# Patient Record
Sex: FEMALE | URBAN - METROPOLITAN AREA
[De-identification: names, ages, dates, MRNs, and addresses within clinical notes are randomized per-mention and may not be internally consistent; named-entity substitution may affect disease eponyms.]

---

## 2022-05-31 ENCOUNTER — OFFICE VISIT (OUTPATIENT)
Dept: URBAN - METROPOLITAN AREA CLINIC 6 | Facility: CLINIC | Age: 72
End: 2022-05-31
Payer: MEDICARE

## 2022-05-31 DIAGNOSIS — H43.813 VITREOUS DEGENERATION, BILATERAL: Primary | ICD-10-CM

## 2022-05-31 DIAGNOSIS — H02.831 DERMATOCHALASIS OF RIGHT UPPER EYELID: ICD-10-CM

## 2022-05-31 DIAGNOSIS — H02.834 DERMATOCHALASIS OF LEFT UPPER EYELID: ICD-10-CM

## 2022-05-31 DIAGNOSIS — H04.123 TEAR FILM INSUFFICIENCY OF BILATERAL LACRIMAL GLANDS: ICD-10-CM

## 2022-05-31 PROCEDURE — 92014 COMPRE OPH EXAM EST PT 1/>: CPT | Performed by: OPTOMETRIST

## 2022-05-31 ASSESSMENT — INTRAOCULAR PRESSURE
OD: 10
OS: 10

## 2023-05-30 ENCOUNTER — OFFICE VISIT (OUTPATIENT)
Dept: URBAN - METROPOLITAN AREA CLINIC 6 | Facility: CLINIC | Age: 73
End: 2023-05-30
Payer: MEDICARE

## 2023-05-30 DIAGNOSIS — H02.834 DERMATOCHALASIS OF LEFT UPPER EYELID: ICD-10-CM

## 2023-05-30 DIAGNOSIS — H31.012 MACULA SCAR OF POST POLE OF LEFT EYE: Primary | ICD-10-CM

## 2023-05-30 DIAGNOSIS — Z96.1 PRESENCE OF INTRAOCULAR LENS: ICD-10-CM

## 2023-05-30 DIAGNOSIS — H53.8 OTHER VISUAL DISTURBANCES: ICD-10-CM

## 2023-05-30 DIAGNOSIS — H02.831 DERMATOCHALASIS OF RIGHT UPPER EYELID: ICD-10-CM

## 2023-05-30 DIAGNOSIS — H02.822 CYSTS OF RIGHT LOWER EYELID: ICD-10-CM

## 2023-05-30 DIAGNOSIS — H43.813 VITREOUS DEGENERATION, BILATERAL: ICD-10-CM

## 2023-05-30 PROCEDURE — 92014 COMPRE OPH EXAM EST PT 1/>: CPT | Performed by: OPTOMETRIST

## 2023-05-30 RX ORDER — CARBOXYMETHYLCELLULOSE SODIUM 5 MG/ML
0.5 % SOLUTION/ DROPS OPHTHALMIC
Qty: 15 | Refills: 6 | Status: ACTIVE
Start: 2023-05-30

## 2023-05-30 ASSESSMENT — INTRAOCULAR PRESSURE
OS: 10
OD: 9

## 2023-05-30 NOTE — IMPRESSION/PLAN
Impression: Cysts of right lower eyelid: H02.822. Plan: cherry angioma -bothersome. To Dr. ROBLES for an evaluation.

## 2023-05-30 NOTE — IMPRESSION/PLAN
Impression: Dermatochalasis of left upper eyelid: H02.834. Plan: Dermatochalasis bilateral upper lids - Patient is not symptomatic. Explained how it may become visually significant. The patient is advised to contact us for any change or obstruction of vision.

## 2023-05-30 NOTE — IMPRESSION/PLAN
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 Plan: Macular scar left eye- History of BRVO s/p PRP. Continue to monitor.

## 2023-06-19 ENCOUNTER — OFFICE VISIT (OUTPATIENT)
Dept: URBAN - METROPOLITAN AREA CLINIC 3 | Facility: CLINIC | Age: 73
End: 2023-06-19
Payer: MEDICARE

## 2023-06-19 DIAGNOSIS — H02.88B MEIBOMIAN GLAND DYSFUNCTION OF LT EYE, UPPER AND LOWER EYELIDS: ICD-10-CM

## 2023-06-19 DIAGNOSIS — D23.112 OTHER BENIGN NEOPLASM OF SKIN OF RT LOWER EYELID, INCLUDING CANTHUS: Primary | ICD-10-CM

## 2023-06-19 DIAGNOSIS — H02.88A MEIBOMIAN GLAND DYSFUNCTION OF RIGHT EYE, UPPER AND LOWER EYELIDS: ICD-10-CM

## 2023-06-19 PROCEDURE — 11440 EXC FACE-MM B9+MARG 0.5 CM/<: CPT | Performed by: OPHTHALMOLOGY

## 2023-06-19 PROCEDURE — 92012 INTRM OPH EXAM EST PATIENT: CPT | Performed by: OPHTHALMOLOGY

## 2023-06-19 ASSESSMENT — INTRAOCULAR PRESSURE
OD: 8
OS: 8

## 2023-06-19 NOTE — IMPRESSION/PLAN
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 Plan: Eyelid Neoplasm Removal - appears to be a benign lesion but the patients is symptomatic and desires removal. Risk, benefits and alternatives discussed and understood, informed consent obtained. Plan to perform today. Patient tolerated well the procedure. Start erythromycin ointment BID for 1 week. RTC prn.

## 2023-06-19 NOTE — IMPRESSION/PLAN
Impression: Meibomian gland dysfunction of right eye, upper and lower eyelids: H02.88A. Plan: Meibomian gland dysfunction bilateral upper and lower lids - Cont. hot compresses and perform lid hygiene daily. Discussed with the patient benefits of flaxseed oil or omega 3 supplements.